# Patient Record
Sex: MALE | Race: WHITE | NOT HISPANIC OR LATINO
[De-identification: names, ages, dates, MRNs, and addresses within clinical notes are randomized per-mention and may not be internally consistent; named-entity substitution may affect disease eponyms.]

---

## 2018-02-22 ENCOUNTER — TRANSCRIPTION ENCOUNTER (OUTPATIENT)
Age: 38
End: 2018-02-22

## 2018-05-03 ENCOUNTER — TRANSCRIPTION ENCOUNTER (OUTPATIENT)
Age: 38
End: 2018-05-03

## 2020-02-27 ENCOUNTER — EMERGENCY (EMERGENCY)
Facility: HOSPITAL | Age: 40
LOS: 1 days | Discharge: HOME | End: 2020-02-27
Attending: EMERGENCY MEDICINE
Payer: COMMERCIAL

## 2020-02-27 VITALS
DIASTOLIC BLOOD PRESSURE: 80 MMHG | TEMPERATURE: 98 F | RESPIRATION RATE: 20 BRPM | HEIGHT: 74 IN | SYSTOLIC BLOOD PRESSURE: 156 MMHG | HEART RATE: 90 BPM | OXYGEN SATURATION: 100 % | WEIGHT: 195.11 LBS

## 2020-02-27 PROCEDURE — 73630 X-RAY EXAM OF FOOT: CPT | Mod: 26,LT

## 2020-02-27 PROCEDURE — 99284 EMERGENCY DEPT VISIT MOD MDM: CPT | Mod: 25

## 2020-02-27 PROCEDURE — 73630 X-RAY EXAM OF FOOT: CPT

## 2020-02-27 PROCEDURE — 29515 APPLICATION SHORT LEG SPLINT: CPT

## 2020-02-27 PROCEDURE — 73610 X-RAY EXAM OF ANKLE: CPT

## 2020-02-27 PROCEDURE — 29515 APPLICATION SHORT LEG SPLINT: CPT | Mod: LT

## 2020-02-27 PROCEDURE — 73610 X-RAY EXAM OF ANKLE: CPT | Mod: 26,LT

## 2020-02-27 PROCEDURE — 99283 EMERGENCY DEPT VISIT LOW MDM: CPT | Mod: 25

## 2020-02-27 NOTE — ED PROVIDER NOTE - OBJECTIVE STATEMENT
38 y/o M c/o pain in left foot and ankle since 11am this morning.  Patient states that he jumped down from a rock climbing wall and rolled his left foot when he landed on an uneven surface.  Patient denies any other injuries.

## 2020-02-27 NOTE — ED ADULT TRIAGE NOTE - CHIEF COMPLAINT QUOTE
Patient arrived to ED today after jumping down off a rock climbing wall today and injuring his left foot and ankle.

## 2020-02-27 NOTE — ED PROVIDER NOTE - PATIENT PORTAL LINK FT
You can access the FollowMyHealth Patient Portal offered by [facility] by registering at the following website: [email or web address]. By joining [employer]’s FollowMyHealth portal, you will also be able to view your health information using other applications (apps) compatible with our system.

## 2020-02-27 NOTE — ED PROVIDER NOTE - ATTENDING CONTRIBUTION TO CARE
seen with acp: well appearing adult male s/p injur to left foot while rock climbing today; neurovasc intact; +swelling and ttp to left lateral foot; xray with noted 5th metatarsal fx, splint, ok for d/c with f/u and precautions

## 2020-02-27 NOTE — ED PROVIDER NOTE - CARE PROVIDER_API CALL
Arvind Tyler (DPM)  Podiatric Medicine and Surgery  [facility] [place] [address]  Phone: [phone]  Fax: [phone]  Follow Up Time:

## 2020-02-27 NOTE — ED PROVIDER NOTE - CARE PLAN
Principal Discharge DX:	Metatarsal fracture, pathologic, left, initial encounter Principal Discharge DX:	Metatarsal fracture

## 2020-02-27 NOTE — ED PROVIDER NOTE - NSFOLLOWUPINSTRUCTIONS_ED_ALL_ED_FT
5th metatarsal fracture - do not bear weight on injured leg, do not remove splint until seeing podiatrist, splint must stay dry

## 2020-03-12 ENCOUNTER — TRANSCRIPTION ENCOUNTER (OUTPATIENT)
Age: 40
End: 2020-03-12

## 2021-06-12 ENCOUNTER — EMERGENCY (EMERGENCY)
Facility: HOSPITAL | Age: 41
LOS: 1 days | End: 2021-06-12
Attending: EMERGENCY MEDICINE
Payer: COMMERCIAL

## 2021-06-12 VITALS
SYSTOLIC BLOOD PRESSURE: 142 MMHG | DIASTOLIC BLOOD PRESSURE: 67 MMHG | RESPIRATION RATE: 18 BRPM | TEMPERATURE: 98 F | WEIGHT: 220.02 LBS | OXYGEN SATURATION: 96 % | HEART RATE: 81 BPM | HEIGHT: 74 IN

## 2021-06-12 PROCEDURE — 99283 EMERGENCY DEPT VISIT LOW MDM: CPT

## 2021-06-12 RX ORDER — CEPHALEXIN 500 MG
500 CAPSULE ORAL ONCE
Refills: 0 | Status: DISCONTINUED | OUTPATIENT
Start: 2021-06-12 | End: 2021-06-17

## 2021-06-12 RX ORDER — CEPHALEXIN 500 MG
1 CAPSULE ORAL
Qty: 40 | Refills: 0
Start: 2021-06-12 | End: 2021-06-21

## 2021-06-12 NOTE — ED PROVIDER NOTE - OBJECTIVE STATEMENT
42 y/o m with no PMH c/o swelling of left eye which started last night.  Patient denies pain with eye movement, discharge, visual changes, fever, foreign body sensation or any other complaints.

## 2021-06-12 NOTE — ED ADULT TRIAGE NOTE - CHIEF COMPLAINT QUOTE
patient states last night noticed swelling and this am worse to left eye able to see unknown cause denies pain

## 2021-06-12 NOTE — ED PROVIDER NOTE - PATIENT PORTAL LINK FT
You can access the FollowMyHealth Patient Portal offered by [facility] by registering at the following website: [email or web address]. By joining [employer]’s FollowMyHealth portal, you will also be able to view your health information using other applications (apps) compatible with our system.

## 2021-06-12 NOTE — ED PROVIDER NOTE - ATTENDING CONTRIBUTION TO CARE
40 y/o M presents with atraumatic edema and erythema around the left eye, denies fevers, chills, pain with eye movement, change in visual acuity.    AP - well appearing, periorbital edema and erythema with some skin flaking, EOMI, PERRL, no conjunctival injection.    Will start ABx for periorbital cellulitis, discussed strict return precautions for symptoms concerning for orbital cellulitis and follow up wit PMD.

## 2021-06-13 ENCOUNTER — EMERGENCY (EMERGENCY)
Facility: HOSPITAL | Age: 41
LOS: 1 days | Discharge: HOME | End: 2021-06-13
Attending: EMERGENCY MEDICINE
Payer: COMMERCIAL

## 2021-06-13 VITALS
HEART RATE: 92 BPM | HEIGHT: 74 IN | RESPIRATION RATE: 18 BRPM | WEIGHT: 199.96 LBS | OXYGEN SATURATION: 98 % | TEMPERATURE: 98 F | SYSTOLIC BLOOD PRESSURE: 145 MMHG | DIASTOLIC BLOOD PRESSURE: 81 MMHG

## 2021-06-13 PROCEDURE — 99283 EMERGENCY DEPT VISIT LOW MDM: CPT

## 2021-06-13 PROCEDURE — 99284 EMERGENCY DEPT VISIT MOD MDM: CPT

## 2021-06-13 RX ORDER — DIPHENHYDRAMINE HCL 50 MG
1 CAPSULE ORAL
Qty: 12 | Refills: 0
Start: 2021-06-13 | End: 2021-06-16

## 2021-06-13 NOTE — ED PROVIDER NOTE - ATTENDING CONTRIBUTION TO CARE
AJM: pt presenting with swelling to left eyelid and patchy rash to ams and torse also with swelling to penis. Rash resembles poison ivy. no signs of orbital or preseptal cellulitis. rash and swelling likely related to same pathology. + itchy. will treat with steroids and benadryl. stop abx. return precautions

## 2021-06-13 NOTE — ED PROVIDER NOTE - PHYSICAL EXAMINATION
Constitional: Awake and alert, in NAD  Eyes: clear bilaterally, swelling of eyelid and surrounding eye, not hot or painful to palpation, full ROM of eye without pain, PERRL  Cardiac: S1S2, RR, no murmur  Resp: CTA/BL, no use of accessory muscles  GI: +BS x 4, soft, NTTP  : mild swelling of penis just below head  Neuro: A&Ox3, CN II-XI GI, HUI x4, 5/5 motors throughout, = sensation  Skin: 3 patches of red, blanching rash to left forearm, and 2 on trunk

## 2021-06-13 NOTE — ED PROVIDER NOTE - CLINICAL SUMMARY MEDICAL DECISION MAKING FREE TEXT BOX
40 y/o M with eyelid swelling that started Friday night and has progressed with scattered mildly itchy rash to body, and swelling to penis.  Will rx steroids 60mg x 5 days, no abx, f/u pcp on Tuesday with strict return precautions.

## 2021-06-13 NOTE — ED PROVIDER NOTE - CARE PLAN
Principal Discharge DX:	Swelling of eye, left  Secondary Diagnosis:	Rash and nonspecific skin eruption

## 2021-06-13 NOTE — ED PROVIDER NOTE - NSFOLLOWUPINSTRUCTIONS_ED_ALL_ED_FT
- if you develop fever, pain to eye, discharge, or any concerning symptoms return to ED  - take prednisone once a day for 5 full days  - stop antibiotics  - follow up with your medical doctor on Tuesday - if you develop fever, pain to eye, discharge, or any concerning symptoms return to ED  - take prednisone once a day for 5 full days  - stop antibiotics  - benadryl 50mg every 6 hours as needed for itch  - follow up with your medical doctor on Tuesday

## 2021-06-13 NOTE — ED PROVIDER NOTE - OBJECTIVE STATEMENT
40 y/o M was in ED last night, dx with preseptal cellulitis and given keflex.  Patient returns as the swelling has worsened. He has a mildly itchy rash to left arm, trunk and penis.  No fevers, no pain to eyeball or surrounding area, no pain with movement.

## 2021-06-13 NOTE — ED PROVIDER NOTE - PATIENT PORTAL LINK FT
You can access the FollowMyHealth Patient Portal offered by [facility] by registering at the following website: [email or web address]. By joining [employer]’s FollowMyHealth portal, you will also be able to view your health information using other applications (apps) compatible with our system.

## 2021-06-13 NOTE — ED PROVIDER NOTE - NS ED ROS FT
General: no fever or chills  Eyes: no redness, discharge,  no visual disturbances, swelling around eye  ENT: no nasal congestion, sore throat  Cardiac: No chest pain, palpitations, peripheral edema  Respiratory: No cough, URENA, SOB or wheeze  GI: No abdominal pain, N/V/D  : no dysuria, hematuria  Musculoskeletal: no joint pain, no back or neck pain  Neuro: No headache or dizziness  Skin: mild itch + rash

## 2022-01-09 NOTE — ED ADULT TRIAGE NOTE - CHIEF COMPLAINT QUOTE
patient was here last night for swelling and redness to left eye, was told cellulitis given ABX, back now for increased swelling to eye area and face with  rash body yes

## 2023-08-30 NOTE — ED ADULT TRIAGE NOTE - RESPIRATORY RATE (BREATHS/MIN)
Called to inform patient to keep appointment with Dr. Castaneda on 9/5/23 and he will go in depth with the MRI results and plan of care. Patient had no further questions or concerns at the time of the call.    18

## 2024-12-06 ENCOUNTER — NON-APPOINTMENT (OUTPATIENT)
Age: 44
End: 2024-12-06

## 2024-12-06 ENCOUNTER — RESULT REVIEW (OUTPATIENT)
Age: 44
End: 2024-12-06

## 2024-12-16 ENCOUNTER — NON-APPOINTMENT (OUTPATIENT)
Age: 44
End: 2024-12-16

## 2024-12-16 ENCOUNTER — EMERGENCY (EMERGENCY)
Facility: HOSPITAL | Age: 44
LOS: 1 days | Discharge: HOME | End: 2024-12-16
Attending: EMERGENCY MEDICINE
Payer: COMMERCIAL

## 2024-12-16 VITALS
RESPIRATION RATE: 18 BRPM | WEIGHT: 190.04 LBS | SYSTOLIC BLOOD PRESSURE: 152 MMHG | HEART RATE: 88 BPM | TEMPERATURE: 98 F | HEIGHT: 74 IN | DIASTOLIC BLOOD PRESSURE: 75 MMHG | OXYGEN SATURATION: 95 %

## 2024-12-16 VITALS
DIASTOLIC BLOOD PRESSURE: 76 MMHG | RESPIRATION RATE: 18 BRPM | HEART RATE: 86 BPM | SYSTOLIC BLOOD PRESSURE: 138 MMHG | TEMPERATURE: 98 F | OXYGEN SATURATION: 96 %

## 2024-12-16 LAB
ALBUMIN SERPL ELPH-MCNC: 4 G/DL — SIGNIFICANT CHANGE UP (ref 3.3–5.2)
ALP SERPL-CCNC: 67 U/L — SIGNIFICANT CHANGE UP (ref 40–120)
ALT FLD-CCNC: 25 U/L — SIGNIFICANT CHANGE UP
ANION GAP SERPL CALC-SCNC: 15 MMOL/L — SIGNIFICANT CHANGE UP (ref 5–17)
ANISOCYTOSIS BLD QL: SLIGHT — SIGNIFICANT CHANGE UP
AST SERPL-CCNC: 26 U/L — SIGNIFICANT CHANGE UP
BASOPHILS # BLD AUTO: 0 K/UL — SIGNIFICANT CHANGE UP (ref 0–0.2)
BASOPHILS NFR BLD AUTO: 0 % — SIGNIFICANT CHANGE UP (ref 0–2)
BILIRUB SERPL-MCNC: 0.5 MG/DL — SIGNIFICANT CHANGE UP (ref 0.4–2)
BUN SERPL-MCNC: 21.8 MG/DL — HIGH (ref 8–20)
CALCIUM SERPL-MCNC: 9.1 MG/DL — SIGNIFICANT CHANGE UP (ref 8.4–10.5)
CHLORIDE SERPL-SCNC: 98 MMOL/L — SIGNIFICANT CHANGE UP (ref 96–108)
CO2 SERPL-SCNC: 23 MMOL/L — SIGNIFICANT CHANGE UP (ref 22–29)
CREAT SERPL-MCNC: 0.76 MG/DL — SIGNIFICANT CHANGE UP (ref 0.5–1.3)
CRP SERPL-MCNC: 94 MG/L — HIGH
EGFR: 115 ML/MIN/1.73M2 — SIGNIFICANT CHANGE UP
EOSINOPHIL # BLD AUTO: 0.14 K/UL — SIGNIFICANT CHANGE UP (ref 0–0.5)
EOSINOPHIL NFR BLD AUTO: 1.8 % — SIGNIFICANT CHANGE UP (ref 0–6)
GLUCOSE SERPL-MCNC: 102 MG/DL — HIGH (ref 70–99)
HCT VFR BLD CALC: 35.9 % — LOW (ref 39–50)
HGB BLD-MCNC: 11.6 G/DL — LOW (ref 13–17)
LYMPHOCYTES # BLD AUTO: 0.56 K/UL — LOW (ref 1–3.3)
LYMPHOCYTES # BLD AUTO: 7 % — LOW (ref 13–44)
MANUAL SMEAR VERIFICATION: SIGNIFICANT CHANGE UP
MCHC RBC-ENTMCNC: 23.5 PG — LOW (ref 27–34)
MCHC RBC-ENTMCNC: 32.3 G/DL — SIGNIFICANT CHANGE UP (ref 32–36)
MCV RBC AUTO: 72.8 FL — LOW (ref 80–100)
MICROCYTES BLD QL: SLIGHT — SIGNIFICANT CHANGE UP
MONOCYTES # BLD AUTO: 0.63 K/UL — SIGNIFICANT CHANGE UP (ref 0–0.9)
MONOCYTES NFR BLD AUTO: 7.9 % — SIGNIFICANT CHANGE UP (ref 2–14)
NEUTROPHILS # BLD AUTO: 6.68 K/UL — SIGNIFICANT CHANGE UP (ref 1.8–7.4)
NEUTROPHILS NFR BLD AUTO: 83.3 % — HIGH (ref 43–77)
PLAT MORPH BLD: NORMAL — SIGNIFICANT CHANGE UP
PLATELET # BLD AUTO: 289 K/UL — SIGNIFICANT CHANGE UP (ref 150–400)
POLYCHROMASIA BLD QL SMEAR: SLIGHT — SIGNIFICANT CHANGE UP
POTASSIUM SERPL-MCNC: 4.5 MMOL/L — SIGNIFICANT CHANGE UP (ref 3.5–5.3)
POTASSIUM SERPL-SCNC: 4.5 MMOL/L — SIGNIFICANT CHANGE UP (ref 3.5–5.3)
PROCALCITONIN SERPL-MCNC: 0.07 NG/ML — SIGNIFICANT CHANGE UP (ref 0.02–0.1)
PROT SERPL-MCNC: 7.3 G/DL — SIGNIFICANT CHANGE UP (ref 6.6–8.7)
RAPID RVP RESULT: DETECTED
RBC # BLD: 4.93 M/UL — SIGNIFICANT CHANGE UP (ref 4.2–5.8)
RBC # FLD: 14.3 % — SIGNIFICANT CHANGE UP (ref 10.3–14.5)
RBC BLD AUTO: ABNORMAL
RV+EV RNA SPEC QL NAA+PROBE: DETECTED
SARS-COV-2 RNA SPEC QL NAA+PROBE: SIGNIFICANT CHANGE UP
SODIUM SERPL-SCNC: 136 MMOL/L — SIGNIFICANT CHANGE UP (ref 135–145)
WBC # BLD: 8.02 K/UL — SIGNIFICANT CHANGE UP (ref 3.8–10.5)
WBC # FLD AUTO: 8.02 K/UL — SIGNIFICANT CHANGE UP (ref 3.8–10.5)

## 2024-12-16 PROCEDURE — 86140 C-REACTIVE PROTEIN: CPT

## 2024-12-16 PROCEDURE — 0225U NFCT DS DNA&RNA 21 SARSCOV2: CPT

## 2024-12-16 PROCEDURE — 71250 CT THORAX DX C-: CPT | Mod: MC

## 2024-12-16 PROCEDURE — 80053 COMPREHEN METABOLIC PANEL: CPT

## 2024-12-16 PROCEDURE — 85025 COMPLETE CBC W/AUTO DIFF WBC: CPT

## 2024-12-16 PROCEDURE — 99284 EMERGENCY DEPT VISIT MOD MDM: CPT | Mod: 25

## 2024-12-16 PROCEDURE — 99284 EMERGENCY DEPT VISIT MOD MDM: CPT

## 2024-12-16 PROCEDURE — 84145 PROCALCITONIN (PCT): CPT

## 2024-12-16 PROCEDURE — 87449 NOS EACH ORGANISM AG IA: CPT

## 2024-12-16 PROCEDURE — 71250 CT THORAX DX C-: CPT | Mod: 26,MC

## 2024-12-16 NOTE — ED PROVIDER NOTE - OBJECTIVE STATEMENT
43 y/o male, with no significant PMHx, presents with fatigue, body aches and shortness of breath x a few days. Patient was evaluated at an urgent care on 12/6 and was treated for pneumonia with Cefpodoxime and Z-renny. Patient reports that he was feeling better but now feels like symptoms are worsening again. Endorses occasional cough. Patient returned to urgent care today and had CXR done showing Increased bilateral lung markings with few residual opacities in the left lower lobe. Denies fever, chills, dizziness, headache, chest pain, abdominal pain, nausea, vomiting, diarrhea or other complaints.

## 2024-12-16 NOTE — ED PROVIDER NOTE - NSFOLLOWUPINSTRUCTIONS_ED_ALL_ED_FT
Follow up with your primary care doctor within 1-2 weeks  Seek emergency department care immediately for any new, worsening or persistent symptoms, recurrent fever  Follow up with your cardiologist regarding calcium on coronary arteries seen on CT.

## 2024-12-16 NOTE — ED ADULT NURSE NOTE - OBJECTIVE STATEMENT
pt AOx4, breathing even and unlabored. assumed care 1904. pt presents to ED c/o cough. pt states +fatigue, body aches and SOB for a few days. Dx w/PNA on 12/6, prescribed antbx. Patient reports that he was feeling better but now feels like symptoms are worsening again. pt returned to UC today, CXR +PNA. Denies fever, chills, dizziness, headache, chest pain, abdominal pain, nausea, vomiting, diarrhea or other complaints. labs sent.

## 2024-12-16 NOTE — ED PROVIDER NOTE - PROGRESS NOTE DETAILS
Lab results reviewed: CBC, CMP and CRP with no significant abnormalities. Pro-calcitonin wnl. CT chest shows multifocal pneumonia. Give patient well appearing, afebrile, Lungs CTA bilat, with no leukocytosis and with normal procalcitonin, CT findings likely evolution of previously treated pneumonia. No further ED workup indicated at this time. Supportive care. Follow up with PCP. Return precautions discussed. Patient verbally demonstrated understanding of results and plan. Patient stable for discharge.

## 2024-12-16 NOTE — ED ADULT NURSE NOTE - NSFALLUNIVINTERV_ED_ALL_ED
Bed/Stretcher in lowest position, wheels locked, appropriate side rails in place/Call bell, personal items and telephone in reach/Instruct patient to call for assistance before getting out of bed/chair/stretcher/Non-slip footwear applied when patient is off stretcher/[place] to call system/Physically safe environment - no spills, clutter or unnecessary equipment/Purposeful proactive rounding/Room/bathroom lighting operational, light cord in reach

## 2024-12-16 NOTE — ED ADULT TRIAGE NOTE - CHIEF COMPLAINT QUOTE
sent in from urgent care for eval. dx with pneumonia 2 weeks ago, placed on 2 abx azithromycin  finished course on 12/13/24. pt reports no relief with abx continues to c/o cough and congestion.

## 2024-12-16 NOTE — ED PROVIDER NOTE - CLINICAL SUMMARY MEDICAL DECISION MAKING FREE TEXT BOX
45 y/o male, with no significant PMHx, presents with fatigue, body aches and shortness of breath x a few days, r/o worsening pneumonia. Lungs CTA bilat; normal respiratory effort. Will check labs and obtain CT chest. Reassess.

## 2024-12-16 NOTE — ED PROVIDER NOTE - PATIENT PORTAL LINK FT
You can access the FollowMyHealth Patient Portal offered by [facility] by registering at the following website: [email or web address]. By joining [employer]’s FollowMyHealth portal, you will also be able to view your health information using other applications (apps) compatible with our system.

## 2024-12-16 NOTE — ED PROVIDER NOTE - ATTENDING APP SHARED VISIT CONTRIBUTION OF CARE
Pt was fully treated for pneumonia, has been fever free and overall improved since initial diagnosis, though still feeling run down and tired. CT performed this evening to follow up repeat CXR, findings are suggestive of multifocal pneumonia, however pt is improved symptomatically, well appearing, afebrile, no leukocytosis, normal procalcitonin, lungs clear on exam. Given this clinical context, I believe these Ct findings are evolution of the previously treated infection rather than a new or acute infection. RVP and legionella were sent for follow up reasons however will hold any additional abx at this time. Advised to follow up primary care, and instructed on return precaution. Pt made aware of incidental calcium on coronaries, he follows with [facility] and will arranged follow up visit.

## 2024-12-17 PROBLEM — Z78.9 OTHER SPECIFIED HEALTH STATUS: Chronic | Status: ACTIVE | Noted: 2021-06-13

## 2024-12-17 LAB — LEGIONELLA AG UR QL: NEGATIVE — SIGNIFICANT CHANGE UP
